# Patient Record
Sex: MALE | Race: WHITE
[De-identification: names, ages, dates, MRNs, and addresses within clinical notes are randomized per-mention and may not be internally consistent; named-entity substitution may affect disease eponyms.]

---

## 2020-10-27 ENCOUNTER — HOSPITAL ENCOUNTER (EMERGENCY)
Dept: HOSPITAL 97 - ER | Age: 30
Discharge: LEFT BEFORE DISCHARGE | End: 2020-10-27
Payer: SELF-PAY

## 2020-10-27 VITALS — OXYGEN SATURATION: 100 % | SYSTOLIC BLOOD PRESSURE: 140 MMHG | TEMPERATURE: 98.6 F | DIASTOLIC BLOOD PRESSURE: 94 MMHG

## 2020-10-27 DIAGNOSIS — Z53.21: Primary | ICD-10-CM

## 2020-10-27 PROCEDURE — 99282 EMERGENCY DEPT VISIT SF MDM: CPT

## 2020-10-27 PROCEDURE — 87804 INFLUENZA ASSAY W/OPTIC: CPT

## 2020-10-27 NOTE — ER
Nurse's Notes                                                                                     

 [facility]                                                                 

Name: Radha Desouza                                                                              

Age: 30 yrs                                                                                       

Sex: Male                                                                                         

: 1990                                                                                   

MRN: [id number]                                                                                   

Arrival Date: 10/27/2020                                                                          

Time: 17:23                                                                                       

Account#: [id number]                                                                            

Bed Waiting                                                                                       

Private MD:                                                                                       

Diagnosis:                                                                                        

                                                                                                  

Presentation:                                                                                     

10/27                                                                                             

17:33 Chief complaint: Patient states: Feels hot and dizzy today. And my anxiety got high.    ca1 

      Cough x 2 days. Feels feverish. Coronavirus screen: Client denies travel out of the         

      [place] in the last 14 days. cough unrelated to allergies, fever, Client presents with at      

      least one sign or symptom that may indicate coronavirus-19. Standard/surgical mask          

      placed on the client. Provider contacted for isolation considerations. The client           

      denies any previous COVID testing. Ebola Screen: Patient negative for fever greater         

      than or equal to 101.5 degrees Fahrenheit, and additional compatible Ebola Virus            

      Disease symptoms Patient denies exposure to infectious person. Patient denies travel to     

      an Ebola-affected area in the 21 days before illness onset. No symptoms or risks            

      identified at this time. Initial Sepsis Screen: Does the patient meet any 2 criteria?       

      No. Patient's initial sepsis screen is negative. Does the patient have a suspected          

      source of infection? No. Patient's initial sepsis screen is negative. Risk Assessment:      

      Do you want to hurt yourself or someone else? Patient reports no desire to harm self or     

      others. Onset of symptoms was 2020.                                             

17:33 Method Of Arrival: Ambulatory                                                           ca1 

17:33 Method Of Arrival: Ambulatory                                                           ca1 

17:33 Acuity: ZHANNA 3                                                                           ca1 

                                                                                                  

Historical:                                                                                       

- Allergies:                                                                                      

17:36 No Known Allergies;                                                                     ca1 

- Home Meds:                                                                                      

17:36 None [Active];                                                                          ca1 

- PMHx:                                                                                           

17:36 None;                                                                                   ca1 

- PSHx:                                                                                           

17:36 None;                                                                                   ca1 

                                                                                                  

- Immunization history:: Adult Immunizations up to date, Flu vaccine is not up to date.           

  It has been more than one year since last vaccine.                                              

- Social history:: Smoking status: Patient reports the use of cigarette tobacco                   

  products, smokes one-half pack cigarettes per day.                                              

                                                                                                  

                                                                                                  

Assessment:                                                                                       

19:24 Reassessment: patient not around.                                                       mg2 

                                                                                                  

Vital Signs:                                                                                      

17:33  / 94; Pulse 101; Resp 16 S; Temp 98.6(O); Pulse Ox 100% on R/A; Weight 86.18 kg  ca1 

      (R); Height 5 ft. 10 in. (177.80 cm) (R); Pain 0/10;                                        

17:33 Body Mass Index 27.26 (86.18 kg, 177.80 cm)                                             ca1 

                                                                                                  

ED Course:                                                                                        

17:23 Patient arrived in ED.                                                                  ds1 

17:36 Triage completed.                                                                       ca1 

17:36 Arm band placed on right wrist.                                                         ca1 

17:38 Flu Sent.                                                                               ca1 

17:40 Flu Sent.                                                                               ca1 

19:10 Patient's name was called from ER lobby. No response. Unable to locate patient. Will    ca1 

      disposition as left without being seen by a provider.                                       

19:21 Judah Jauregui MD is Attending Physician.                                                    pkl 

                                                                                                  

Administered Medications:                                                                         

No medications were administered                                                                  

                                                                                                  

                                                                                                  

Outcome:                                                                                          

19:42 Patient left the ED.                                                                    ca1 

                                                                                                  

Signatures:                                                                                       

Judah Jauregui MD MD   pkl                                                  

Angelia Benitez                                ds1                                                  

Terry Doe, RN                    RN   mg2                                                  

Ya Keen RN                        RN   ca1                                                  

                                                                                                  

**************************************************************************************************